# Patient Record
Sex: MALE | Race: WHITE | URBAN - METROPOLITAN AREA
[De-identification: names, ages, dates, MRNs, and addresses within clinical notes are randomized per-mention and may not be internally consistent; named-entity substitution may affect disease eponyms.]

---

## 2017-02-01 ENCOUNTER — TRANSFERRED RECORDS (OUTPATIENT)
Dept: HEALTH INFORMATION MANAGEMENT | Facility: CLINIC | Age: 72
End: 2017-02-01

## 2017-08-25 ENCOUNTER — TRANSFERRED RECORDS (OUTPATIENT)
Dept: HEALTH INFORMATION MANAGEMENT | Facility: CLINIC | Age: 72
End: 2017-08-25

## 2017-09-28 ENCOUNTER — TRANSFERRED RECORDS (OUTPATIENT)
Dept: HEALTH INFORMATION MANAGEMENT | Facility: CLINIC | Age: 72
End: 2017-09-28

## 2017-10-02 ENCOUNTER — TRANSFERRED RECORDS (OUTPATIENT)
Dept: HEALTH INFORMATION MANAGEMENT | Facility: CLINIC | Age: 72
End: 2017-10-02

## 2017-10-03 ENCOUNTER — TRANSFERRED RECORDS (OUTPATIENT)
Dept: HEALTH INFORMATION MANAGEMENT | Facility: CLINIC | Age: 72
End: 2017-10-03

## 2017-10-08 ENCOUNTER — PRE VISIT (OUTPATIENT)
Dept: CARDIOLOGY | Facility: CLINIC | Age: 72
End: 2017-10-08

## 2017-10-08 NOTE — TELEPHONE ENCOUNTER
Referral from Dr Condon at [facility] for aortic stenosis.  Records in Care Everywhere, films requested.  Patient schedule for Dr Hanley on 10/19.

## 2017-10-11 ENCOUNTER — DOCUMENTATION ONLY (OUTPATIENT)
Dept: CARDIOLOGY | Facility: CLINIC | Age: 72
End: 2017-10-11

## 2017-10-11 PROBLEM — I35.0 AS (AORTIC STENOSIS): Status: ACTIVE | Noted: 2017-09-28

## 2017-10-11 RX ORDER — PANTOPRAZOLE SODIUM 40 MG/1
40 TABLET, DELAYED RELEASE ORAL
COMMUNITY
Start: 2016-02-15

## 2017-10-11 RX ORDER — TAMSULOSIN HYDROCHLORIDE 0.4 MG/1
0.4 CAPSULE ORAL
COMMUNITY
Start: 2015-12-23

## 2017-10-11 RX ORDER — ASPIRIN 81 MG/1
81 TABLET ORAL
COMMUNITY
Start: 2016-01-13

## 2017-10-11 RX ORDER — METOPROLOL TARTRATE 25 MG/1
25 TABLET, FILM COATED ORAL
COMMUNITY
Start: 2017-02-01

## 2017-10-11 RX ORDER — ESCITALOPRAM OXALATE 20 MG/1
20 TABLET ORAL EVERY MORNING
COMMUNITY

## 2017-10-11 RX ORDER — ATORVASTATIN CALCIUM 80 MG/1
80 TABLET, FILM COATED ORAL
COMMUNITY
Start: 2017-02-01

## 2017-10-18 ENCOUNTER — PRE VISIT (OUTPATIENT)
Dept: CARDIOLOGY | Facility: CLINIC | Age: 72
End: 2017-10-18

## 2017-10-19 ENCOUNTER — OFFICE VISIT (OUTPATIENT)
Dept: CARDIOLOGY | Facility: CLINIC | Age: 72
End: 2017-10-19
Attending: THORACIC SURGERY (CARDIOTHORACIC VASCULAR SURGERY)
Payer: MEDICARE

## 2017-10-19 VITALS
HEART RATE: 62 BPM | OXYGEN SATURATION: 98 % | SYSTOLIC BLOOD PRESSURE: 153 MMHG | HEIGHT: 70 IN | BODY MASS INDEX: 26.2 KG/M2 | DIASTOLIC BLOOD PRESSURE: 94 MMHG | WEIGHT: 183 LBS

## 2017-10-19 DIAGNOSIS — I35.0 NONRHEUMATIC AORTIC VALVE STENOSIS: Primary | ICD-10-CM

## 2017-10-19 PROCEDURE — 99214 OFFICE O/P EST MOD 30 MIN: CPT | Mod: ZF

## 2017-10-19 ASSESSMENT — PAIN SCALES - GENERAL: PAINLEVEL: NO PAIN (0)

## 2017-10-19 NOTE — MR AVS SNAPSHOT
After Visit Summary   10/19/2017    Bhavin Arshad    MRN: [id number]           Patient Information     Date Of Birth          1945        Visit Information        Provider Department      10/19/2017 10:00 AM Curtis Hanley MD [facility]        Today's Diagnoses     Nonrheumatic aortic valve stenosis    -  1      Care Instructions    You were seen today in the [facility]                     Cardiothoracic Surgery Clinic    Your surgeon was Dr Curtis Hanley recommends:    1. A transesophageal echocardiogram to better see the morphology of the valve and the function of the heart to assess timing of the valve replacement.      Please feel free to call me with any questions or concerns.    Kelly Quintana, RN Care Coordinator  Cardiothoracic Surgery Division  [facility]   [phone]                Follow-ups after your visit        Your next 10 appointments already scheduled     Oct 24, 2017 10:00 AM CDT   Ech Yassine with [id number]   [facility], [place],  Echocardiography ([facility], [place])    [address] [facility] [address] [phone]           1.  Please bring or wear a comfortable two-piece outfit. 2.  Arrival time: -   [facility]:  arrive 75 minutes prior to examination time. -   [facility]:  arrive 90 minutes prior to examination time. -   [facility]:   arrive 15 minutes prior to examination time. 3.  Plan to have someone here to drive you home after the test. -   Someone should stay with you for 6 hours after your test. 4.  No food or drink: -   6 hours before the test 5.  If you take antacids or water pills (diuretics): Do not take them until after your test. You may take blood pressure medicine with a few sips of water. 6.  If you have diabetes: -   Morning slots preferred -   If you take insulin, call your diabetes care team. Ask if you should take a   dose the morning of your test. -    If you take diabetes medicine by mouth, don't take it on the morning of your test. Bring it with you to take after the test. (If you have questions, call your diabetes care team.) 7.  Bring a list of any medicines you are taking. 8.  Do not drive for 24 hours after the test. 9.   A responsible adult must stay with you for 24 hours after the test.  10.  For any questions that cannot be answered, please contact the ordering physician              Future tests that were ordered for you today     Open Future Orders        Priority Expected Expires Ordered    Transesophageal Echocardiogram Routine 10/19/2017 12/29/2017 10/19/2017            Who to contact     If you have questions or need follow up information about today's clinic visit or your schedule please contact [facility] directly at [phone].  Normal or non-critical lab and imaging results will be communicated to you by MyChart, letter or phone within 4 business days after the clinic has received the results. If you do not hear from us within 7 days, please contact the clinic through [employer] or phone. If you have a critical or abnormal lab result, we will notify you by phone as soon as possible.  Submit refill requests through [employer] or call your pharmacy and they will forward the refill request to us. Please allow 3 business days for your refill to be completed.          Additional Information About Your Visit        [employer] Information     [employer] gives you secure access to your electronic health record. If you see a primary care provider, you can also send messages to your care team and make appointments. If you have questions, please call your primary care clinic.  If you do not have a primary care provider, please call [phone] and they will assist you.        Care EveryWhere ID     This is your Care EveryWhere ID. This could be used by other organizations to access your [facility] medical records  [id number]        Your Vitals Were      "Pulse Height Pulse Oximetry BMI (Body Mass Index)          62 1.778 m (5' 10\") 98% 26.26 kg/m2         Blood Pressure from Last 3 Encounters:   10/19/17 (!) 153/94    Weight from Last 3 Encounters:   10/19/17 83 kg (183 lb)               Primary Care Provider Office Phone # Fax #    Abe Ac [phone]       [facility] [id number] [facility] [address]        Equal Access to Services     WANDER RENO : Hadii aad ku hadasho Soomaali, waaxda luqadaha, qaybta kaalmada adeegyada, waxay idiin hayaan adebhavik malik . So [facility] [phone].    ATENCIÓN: Si habla español, tiene a almaguer disposición servicios gratuitos de asistencia lingüística. Llame al [phone].    We comply with applicable federal civil rights laws and [place] laws. We do not discriminate on the basis of race, color, national origin, age, disability, sex, sexual orientation, or gender identity.            Thank you!     Thank you for choosing [facility]  for your care. Our goal is always to provide you with excellent care. Hearing back from our patients is one way we can continue to improve our services. Please take a few minutes to complete the written survey that you may receive in the mail after your visit with us. Thank you!             Your Updated Medication List - Protect others around you: Learn how to safely use, store and throw away your medicines at www.disposemymeds.org.          This list is accurate as of: 10/19/17 11:06 AM.  Always use your most recent med list.                   Brand Name Dispense Instructions for use Diagnosis    aspirin EC 81 MG EC tablet      Take 81 mg by mouth        atorvastatin 80 MG tablet    LIPITOR     Take 80 mg by mouth        DOCOSAHEXAENOIC ACID PO           escitalopram 20 MG tablet    LEXAPRO     Take 20 mg by mouth every morning        metoprolol 25 MG tablet    LOPRESSOR     Take 25 mg by mouth        pantoprazole 40 MG EC tablet    PROTONIX     Take 40 mg by " mouth        tamsulosin 0.4 MG capsule    FLOMAX     Take 0.4 mg by mouth

## 2017-10-19 NOTE — LETTER
10/19/2017      RE: Bhavin Arshad  [address]  [place] [address]       Dear Colleague,    Thank you for the opportunity to participate in the care of your patient, Bhavin Arshad, at the [facility]. Please see a copy of my visit note below.    ASKED BY REFERRING PHYSICIAN: Dr Taurus Cruz, Becky  to see patient regarding surgical treatment of aortic stenosis     CHIEF COMPLAINT: aortic stenosis       HPI: Bhavin is a 72 year old male who presents with 2 year history of aortic stenosis.  Patient was referred by Dr Taurus Cruz at [facility] [place].   Most recent echo in 08/2017 showed mean gradient of 37 and valve area of 0.8 cm2.  Patient has history of NSTEMI in 2012 with 4 vessel bypass.     Patient denies obvious symptoms of shortness of breath and fatigue.    PAST MEDICAL HISTORY:  Past Medical History:   Diagnosis Date     AS (aortic stenosis) 9/28/2017     HTN (hypertension) 5/22/2012     Hyperglycemia 5/26/2012     Hyperlipidemia with target low density lipoprotein (LDL) cholesterol less than 70 mg/dL 5/24/2012     NSTEMI (non-ST elevated myocardial infarction) (H) 5/24/2012       PAST SURGICAL HISTORY:  No past surgical history on file.    FAMILY HISTORY:   No family history on file.    SOCIAL HISTORY:  Social History     Social History     Marital status:      Spouse name: N/A     Number of children: N/A     Years of education: N/A     Occupational History     Not on file.     Social History Main Topics     Smoking status: Not on file     Smokeless tobacco: Not on file     Alcohol use Not on file     Drug use: Not on file     Sexual activity: Not on file     Other Topics Concern     Not on file     Social History Narrative     No narrative on file        ALLERGIES:   Allergies no known allergies    CURRENT MEDICATIONS:   Prescription Medications as of 10/19/2017             aspirin EC 81 MG EC tablet Take 81 mg by mouth     atorvastatin (LIPITOR) 80 MG tablet Take 80 mg by mouth    metoprolol (LOPRESSOR) 25 MG tablet Take 25 mg by mouth    pantoprazole (PROTONIX) 40 MG EC tablet Take 40 mg by mouth    tamsulosin (FLOMAX) 0.4 MG capsule Take 0.4 mg by mouth    DOCOSAHEXAENOIC ACID PO     escitalopram (LEXAPRO) 20 MG tablet Take 20 mg by mouth every morning          REVIEW OF SYSTEMS:   Gen: denies frequent headaches, double/blurry vision, insomnia, fatigue, unexplained weight loss/gain. No previous anesthesia reactions.  CV: denies chest pain, shortness of breath, palpitations, peripheral edema.   Pulm: denies shortness of breath, asthma or wheezing  GI/: denies liver or kidney problems, blood in stool or BRBPR, difficulty urinating  Endo: denies thyroid problems or Diabetes  Heme/Onc: denies bleeding or clotting disorders, no family problems with bleeding/clotting diorders  MS: no weakness, tremors or gait instability  Neuro: denies depression, memory problems, no dysesthesias, no previous strokes, no migraines, no dysphagia  Skin: No petechiae, purpura or rash.    PHYSICAL EXAMINATION:   There were no vitals taken for this visit.  General: alert and oriented x 3, pleasant, no acute distress  CV: S1 S2, there is systolic murmur;  No rubs or gallops, regular rate and rhythm, no peripheral edema, no carotid or abdomenal bruits, pulses in upper and lower extremities palpable  Pulm: bilateral breath sounds, clear to auscultation, easy work of breathing  GI: (+) bowel sounds, soft non-tender and non-distended  : voiding without problems  MS: moves all extremities x 4,  5+/5+ equal strength bilaterally  Neuro: pupils equal round and reactive to light, cranial nerves, II-XII grossly intact, no gross neurologic deficits noted    LABS:  BMP RESULTS:  No results found for: NA, POTASSIUM, CHLORIDE, CO2, ANIONGAP, GLC, BUN, CR, GFRESTIMATED, GFRESTBLACK, MATHIEU     CBC RESULTS:  No results found for: WBC, RBC, HGB, HCT, MCV, MCH, MCHC, RDW,  PLT    No results found for: INR  No results found for: PTT  No results found for: UA  No results found for: A1C    PROCEDURES/IMAGING:  PROCEDURES/IMAGING:  See scanned documents in patient's chart.     STRESS ECHO: 10/03/17  ECHO: 08/25/17  CARDIAC CATH: 2012        ASSESSMENT/PLAN:     Bhavin is a 72 year old male who has history of CAD, status post CABG presents with worsening gradient of aortic valve stenosis which was diagnosed for at least two years.  Patient appears to have minimal symptoms.     Recommend transesophageal ECHO to assess the valve leaflets and decide the severity of aortic stenosis.  If aortic stenosis is indeed severe would recommend AVR.  The choices of surgical AVR vs TAVR were discussed with the patient.   Will decide which approach will be more appropriate for this patient based on the image studies.  Might need coronary angiogram.         Approximately 60 minutes spent with this case including review of the clinical history and data; discussion with the patient and his family and coordination of the care     Thank you for including me in the care of this kind patient. Do not hesitate to contact me with any questions.     Dr. Curtis Hanley     Cardiothoracic Surgery  [facility]  Patient Care Team:  Abe Ac as PCP - General (Internal Medicine)  SELF, REFERRED

## 2017-10-19 NOTE — TELEPHONE ENCOUNTER
ASKED BY REFERRING PHYSICIAN: Dr Taurus Cruz, Becky      CHIEF COMPLAINT: Pre Visit Planning - Done (New consult for aortic stenosis from [facility])      HPI: Bhavin is a 72 year old male who presents with 2 year history of aortic stenosis.  Patient was referred by Dr Taurus Cruz at [facility].   Most recent echo in 08/2017 showed mean gradient of 37 and valve area of 0.8 cm2.  Patient has history of NSTEMI in 2012 with 4 vessel bypass.       PROCEDURES/IMAGING:  See scanned documents in patient's chart.    STRESS ECHO: 10/03/17  ECHO: 08/25/17  CARDIAC CATH: 2012       ASSESSMENT/PLAN:   Bhavin is a 72 year old male who presents with           Valve choices were discussed with the patient, mechanical and bioprosthetic. Risks and benefits of both explained.     Risks and benefits of surgery were discussed with patient (and all present) including risk of death, stroke, bleeding, cardiac ischemia, wound infection, renal failure, arrhythmias and possible pacemaker implantation. Patient verbalized understanding and accepts these risks.     Approximately 60 minutes spent with this case including review of the clinical history and data; discussion with the patient and his family and coordination of the care    Thank you for including me in the care of this kind patient. Do not hesitate to contact me with any questions.    Dr. Curtis Hanley     Cardiothoracic Surgery  [facility]  No care team member to display

## 2017-10-19 NOTE — PATIENT INSTRUCTIONS
You were seen today in the [facility]                     Cardiothoracic Surgery Clinic    Your surgeon was Dr Curtis Hanley recommends:    1. A transesophageal echocardiogram to better see the morphology of the valve and the function of the heart to assess timing of the valve replacement.      Please feel free to call me with any questions or concerns.    Kelly Quintana, RN Care Coordinator  Cardiothoracic Surgery Division  [facility]   [phone]

## 2017-10-19 NOTE — NURSING NOTE
Chief Complaint   Patient presents with     New Patient     New consult for aortic stenosis from [facility]     Vitals were taken and medications were reconciled.     Yuridia Myers,PHILIPPE  10:11 AM

## 2017-10-19 NOTE — PROGRESS NOTES
ASKED BY REFERRING PHYSICIAN: Dr Taurus Cruz, Becky  to see patient regarding surgical treatment of aortic stenosis     CHIEF COMPLAINT: aortic stenosis       HPI: Bhavin is a 72 year old male who presents with 2 year history of aortic stenosis.  Patient was referred by Dr Taurus Cruz at [facility] [place].   Most recent echo in 08/2017 showed mean gradient of 37 and valve area of 0.8 cm2.  Patient has history of NSTEMI in 2012 with 4 vessel bypass.     Patient denies obvious symptoms of shortness of breath and fatigue.    PAST MEDICAL HISTORY:  Past Medical History:   Diagnosis Date     AS (aortic stenosis) 9/28/2017     HTN (hypertension) 5/22/2012     Hyperglycemia 5/26/2012     Hyperlipidemia with target low density lipoprotein (LDL) cholesterol less than 70 mg/dL 5/24/2012     NSTEMI (non-ST elevated myocardial infarction) (H) 5/24/2012       PAST SURGICAL HISTORY:  No past surgical history on file.    FAMILY HISTORY:   No family history on file.    SOCIAL HISTORY:  Social History     Social History     Marital status:      Spouse name: N/A     Number of children: N/A     Years of education: N/A     Occupational History     Not on file.     Social History Main Topics     Smoking status: Not on file     Smokeless tobacco: Not on file     Alcohol use Not on file     Drug use: Not on file     Sexual activity: Not on file     Other Topics Concern     Not on file     Social History Narrative     No narrative on file        ALLERGIES:   Allergies no known allergies    CURRENT MEDICATIONS:   Prescription Medications as of 10/19/2017             aspirin EC 81 MG EC tablet Take 81 mg by mouth    atorvastatin (LIPITOR) 80 MG tablet Take 80 mg by mouth    metoprolol (LOPRESSOR) 25 MG tablet Take 25 mg by mouth    pantoprazole (PROTONIX) 40 MG EC tablet Take 40 mg by mouth    tamsulosin (FLOMAX) 0.4 MG capsule Take 0.4 mg by mouth    DOCOSAHEXAENOIC ACID PO     escitalopram (LEXAPRO) 20 MG tablet Take  20 mg by mouth every morning          REVIEW OF SYSTEMS:   Gen: denies frequent headaches, double/blurry vision, insomnia, fatigue, unexplained weight loss/gain. No previous anesthesia reactions.  CV: denies chest pain, shortness of breath, palpitations, peripheral edema.   Pulm: denies shortness of breath, asthma or wheezing  GI/: denies liver or kidney problems, blood in stool or BRBPR, difficulty urinating  Endo: denies thyroid problems or Diabetes  Heme/Onc: denies bleeding or clotting disorders, no family problems with bleeding/clotting diorders  MS: no weakness, tremors or gait instability  Neuro: denies depression, memory problems, no dysesthesias, no previous strokes, no migraines, no dysphagia  Skin: No petechiae, purpura or rash.    PHYSICAL EXAMINATION:   There were no vitals taken for this visit.  General: alert and oriented x 3, pleasant, no acute distress  CV: S1 S2, there is systolic murmur;  No rubs or gallops, regular rate and rhythm, no peripheral edema, no carotid or abdomenal bruits, pulses in upper and lower extremities palpable  Pulm: bilateral breath sounds, clear to auscultation, easy work of breathing  GI: (+) bowel sounds, soft non-tender and non-distended  : voiding without problems  MS: moves all extremities x 4,  5+/5+ equal strength bilaterally  Neuro: pupils equal round and reactive to light, cranial nerves, II-XII grossly intact, no gross neurologic deficits noted    LABS:  BMP RESULTS:  No results found for: NA, POTASSIUM, CHLORIDE, CO2, ANIONGAP, GLC, BUN, CR, GFRESTIMATED, GFRESTBLACK, MATHIEU     CBC RESULTS:  No results found for: WBC, RBC, HGB, HCT, MCV, MCH, MCHC, RDW, PLT    No results found for: INR  No results found for: PTT  No results found for: UA  No results found for: A1C    PROCEDURES/IMAGING:  PROCEDURES/IMAGING:  See scanned documents in patient's chart.     STRESS ECHO: 10/03/17  ECHO: 08/25/17  CARDIAC CATH: 2012        ASSESSMENT/PLAN:     Bhavin is a 72 year old  male who has history of CAD, status post CABG presents with worsening gradient of aortic valve stenosis which was diagnosed for at least two years.  Patient appears to have minimal symptoms.     Recommend transesophageal ECHO to assess the valve leaflets and decide the severity of aortic stenosis.  If aortic stenosis is indeed severe would recommend AVR.  The choices of surgical AVR vs TAVR were discussed with the patient.   Will decide which approach will be more appropriate for this patient based on the image studies.  Might need coronary angiogram.         Approximately 60 minutes spent with this case including review of the clinical history and data; discussion with the patient and his family and coordination of the care     Thank you for including me in the care of this kind patient. Do not hesitate to contact me with any questions.     Dr. Curtis Hanley     Cardiothoracic Surgery  [facility]  Patient Care Team:  Abe Ac as PCP - General (Internal Medicine)  SELF, REFERRED

## 2017-10-20 NOTE — NURSING NOTE
Patient seen today for consultation for aortic stenosis    Reviewed patient's CT and Echo done at [facility]      Patient to have CORINNA for better assess of valve     Instructed patient on preparation for CORINNA    Patient and spouse verbalized understanding of all instructions and will call with any questions or concerns.

## 2017-11-20 ENCOUNTER — CARE COORDINATION (OUTPATIENT)
Dept: CARDIOLOGY | Facility: CLINIC | Age: 72
End: 2017-11-20

## 2017-11-20 NOTE — PROGRESS NOTES
Called and spoke to patient regarding rescheduling his CORINNA that he canceled.  Patient states he spoke to his cardiologist at [facility] and he recommended the patient wait until January and redo the echocardiogram and decide if further evaluation is needed.  Patient will follow up at [facility] for future tests.  Patient thankful for the follow up call and will call this writer if needed.

## 2019-09-28 ENCOUNTER — HEALTH MAINTENANCE LETTER (OUTPATIENT)
Age: 74
End: 2019-09-28

## 2020-03-15 ENCOUNTER — HEALTH MAINTENANCE LETTER (OUTPATIENT)
Age: 75
End: 2020-03-15

## 2021-01-10 ENCOUNTER — HEALTH MAINTENANCE LETTER (OUTPATIENT)
Age: 76
End: 2021-01-10

## 2021-05-08 ENCOUNTER — HEALTH MAINTENANCE LETTER (OUTPATIENT)
Age: 76
End: 2021-05-08

## 2021-10-23 ENCOUNTER — HEALTH MAINTENANCE LETTER (OUTPATIENT)
Age: 76
End: 2021-10-23

## 2022-06-04 ENCOUNTER — HEALTH MAINTENANCE LETTER (OUTPATIENT)
Age: 77
End: 2022-06-04

## 2022-10-09 ENCOUNTER — HEALTH MAINTENANCE LETTER (OUTPATIENT)
Age: 77
End: 2022-10-09

## 2023-06-10 ENCOUNTER — HEALTH MAINTENANCE LETTER (OUTPATIENT)
Age: 78
End: 2023-06-10